# Patient Record
Sex: FEMALE | Race: WHITE | NOT HISPANIC OR LATINO | Employment: UNEMPLOYED | URBAN - METROPOLITAN AREA
[De-identification: names, ages, dates, MRNs, and addresses within clinical notes are randomized per-mention and may not be internally consistent; named-entity substitution may affect disease eponyms.]

---
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
## 2025-01-08 ENCOUNTER — TELEPHONE (OUTPATIENT)
Dept: OBGYN CLINIC | Facility: CLINIC | Age: 20
End: 2025-01-08

## 2025-01-28 ENCOUNTER — TELEPHONE (OUTPATIENT)
Age: 20
End: 2025-01-28

## 2025-01-28 NOTE — TELEPHONE ENCOUNTER
Received call from [facility] -[phone] (p) direct line  Patient is scheduled for MRI brain wo on 2/4/25. MRI needs authorization. Referral updated w/all information.      Pre-Cert - fyi

## 2025-02-20 PROBLEM — K59.09 OTHER CONSTIPATION: Status: RESOLVED | Noted: 2024-07-10 | Resolved: 2025-02-20

## 2025-02-20 PROBLEM — L70.0 ACNE VULGARIS: Status: RESOLVED | Noted: 2020-09-10 | Resolved: 2025-02-20

## 2025-02-20 PROBLEM — M79.602 PAIN OF LEFT UPPER EXTREMITY: Status: RESOLVED | Noted: 2023-04-05 | Resolved: 2025-02-20

## 2025-03-18 ENCOUNTER — TELEPHONE (OUTPATIENT)
Dept: OBGYN CLINIC | Facility: CLINIC | Age: 20
End: 2025-03-18

## 2025-03-18 DIAGNOSIS — Z30.016 ENCOUNTER FOR INITIAL PRESCRIPTION OF TRANSDERMAL PATCH HORMONAL CONTRACEPTIVE DEVICE: ICD-10-CM

## 2025-03-18 RX ORDER — NORELGESTROMIN AND ETHINYL ESTRADIOL 150; 35 UG/D; UG/D
PATCH TRANSDERMAL
Qty: 3 PATCH | Refills: 0 | Status: SHIPPED | OUTPATIENT
Start: 2025-03-18

## 2025-03-18 NOTE — TELEPHONE ENCOUNTER
----- Message from HILDA Higgins sent at 3/18/2025 10:19 AM EDT -----  This pt was seen in October, was to have a 3 months Patch F/U, I ordered 1 month she needs an appointment for more refills

## 2025-04-21 ENCOUNTER — TELEPHONE (OUTPATIENT)
Dept: NEUROLOGY | Facility: CLINIC | Age: 20
End: 2025-04-21